# Patient Record
Sex: FEMALE | Race: WHITE
[De-identification: names, ages, dates, MRNs, and addresses within clinical notes are randomized per-mention and may not be internally consistent; named-entity substitution may affect disease eponyms.]

---

## 2017-07-03 ENCOUNTER — HOSPITAL ENCOUNTER (OUTPATIENT)
Dept: HOSPITAL 19 - SDCO | Age: 32
Discharge: HOME | End: 2017-07-03
Attending: SPECIALIST
Payer: SELF-PAY

## 2017-07-03 VITALS — DIASTOLIC BLOOD PRESSURE: 89 MMHG | SYSTOLIC BLOOD PRESSURE: 117 MMHG | HEART RATE: 86 BPM | TEMPERATURE: 98.4 F

## 2017-07-03 VITALS — SYSTOLIC BLOOD PRESSURE: 122 MMHG | HEART RATE: 87 BPM | DIASTOLIC BLOOD PRESSURE: 71 MMHG | TEMPERATURE: 98.3 F

## 2017-07-03 VITALS — DIASTOLIC BLOOD PRESSURE: 63 MMHG | SYSTOLIC BLOOD PRESSURE: 115 MMHG | HEART RATE: 78 BPM

## 2017-07-03 VITALS — HEIGHT: 68 IN | BODY MASS INDEX: 40.4 KG/M2 | WEIGHT: 266.54 LBS

## 2017-07-03 VITALS — DIASTOLIC BLOOD PRESSURE: 67 MMHG | HEART RATE: 78 BPM | SYSTOLIC BLOOD PRESSURE: 114 MMHG

## 2017-07-03 DIAGNOSIS — K20.9: Primary | ICD-10-CM

## 2017-07-03 DIAGNOSIS — K21.0: ICD-10-CM

## 2017-07-03 DIAGNOSIS — R13.12: ICD-10-CM

## 2019-07-19 ENCOUNTER — HOSPITAL ENCOUNTER (OUTPATIENT)
Dept: HOSPITAL 19 - SDCO | Age: 34
Discharge: HOME | End: 2019-07-19
Attending: SPECIALIST
Payer: SELF-PAY

## 2019-07-19 VITALS — HEART RATE: 84 BPM | DIASTOLIC BLOOD PRESSURE: 82 MMHG | SYSTOLIC BLOOD PRESSURE: 142 MMHG

## 2019-07-19 VITALS — DIASTOLIC BLOOD PRESSURE: 88 MMHG | SYSTOLIC BLOOD PRESSURE: 132 MMHG | HEART RATE: 74 BPM

## 2019-07-19 VITALS — DIASTOLIC BLOOD PRESSURE: 74 MMHG | HEART RATE: 82 BPM | TEMPERATURE: 98.3 F | SYSTOLIC BLOOD PRESSURE: 124 MMHG

## 2019-07-19 VITALS — WEIGHT: 275.8 LBS | BODY MASS INDEX: 41.8 KG/M2 | HEIGHT: 68 IN

## 2019-07-19 VITALS — SYSTOLIC BLOOD PRESSURE: 142 MMHG | TEMPERATURE: 98.5 F | DIASTOLIC BLOOD PRESSURE: 92 MMHG | HEART RATE: 82 BPM

## 2019-07-19 DIAGNOSIS — E66.01: ICD-10-CM

## 2019-07-19 DIAGNOSIS — D50.9: ICD-10-CM

## 2019-07-19 DIAGNOSIS — Z88.1: ICD-10-CM

## 2019-07-19 DIAGNOSIS — K22.70: ICD-10-CM

## 2019-07-19 DIAGNOSIS — K21.9: Primary | ICD-10-CM

## 2019-07-19 DIAGNOSIS — R19.7: ICD-10-CM

## 2019-07-19 NOTE — NUR
Dr. Metzger is at the patient's bedside discussing the results of the
procedure with her and her friend at this time. The patient appeared to
tolerate the water well and voices a desire to be discharged homel.

## 2019-07-19 NOTE — NUR
The patient was escorted out via ambulation to a private vehicle by RAJ Powell.
The patient's belongings and discharge paperwork were sent with her. The
patietn's friend, Janna, is present to drive her home.

## 2019-07-19 NOTE — NUR
Discharge instructions were reviewed with the patient and her friend at this
time. They both verbalized understanding and have no questions for the nurse
at this time. The patient's IV to her right hand was removed and a pressure
dressing was applied to the site. The nurse instructed the patient to get
dressed while her friend pulls the car up to the patient entrance.

## 2019-07-19 NOTE — NUR
The patient arrived back to [facility] from [facility] at this time. The
patient appears alert and oriented and denies any pain or nausea at this time.
The patient ambulated from the cart to the recliner in her room with the stand
by assistance of two nurses and appeared to tolerate the activity well. The
patient requests to try some water at this time. The patient's post procedure
vital signs were started at this time. The patient's friend is present at her
bedside. Call light is within reach. Will continue to monitor the patient.

## 2024-09-20 ENCOUNTER — HOSPITAL ENCOUNTER (OUTPATIENT)
Dept: HOSPITAL 19 - SDCO | Age: 39
Discharge: HOME | End: 2024-09-20
Attending: SPECIALIST
Payer: COMMERCIAL

## 2024-09-20 VITALS — BODY MASS INDEX: 44.41 KG/M2 | WEIGHT: 293 LBS | HEIGHT: 68 IN

## 2024-09-20 VITALS — HEART RATE: 70 BPM | SYSTOLIC BLOOD PRESSURE: 135 MMHG | DIASTOLIC BLOOD PRESSURE: 87 MMHG

## 2024-09-20 VITALS — DIASTOLIC BLOOD PRESSURE: 86 MMHG | SYSTOLIC BLOOD PRESSURE: 144 MMHG | HEART RATE: 73 BPM

## 2024-09-20 VITALS — TEMPERATURE: 97.4 F | SYSTOLIC BLOOD PRESSURE: 144 MMHG | HEART RATE: 70 BPM | DIASTOLIC BLOOD PRESSURE: 93 MMHG

## 2024-09-20 DIAGNOSIS — K21.00: Primary | ICD-10-CM

## 2024-09-20 DIAGNOSIS — K22.2: ICD-10-CM

## 2024-09-20 DIAGNOSIS — E66.9: ICD-10-CM

## 2024-09-20 DIAGNOSIS — K22.70: ICD-10-CM

## 2024-09-20 DIAGNOSIS — K44.9: ICD-10-CM

## 2024-09-20 DIAGNOSIS — Z85.828: ICD-10-CM

## 2024-09-20 NOTE — NUR
0850- PT BACK FROM PROCEDURE TO [facility] VIA CART. PT AMBULATED FROM CART TO
CHAIR WITH ASSISTANCE. MONITORS ON AND ALARMS SET. REPORT RECIEVED FROM
[facility]. PT ALERT AND ORIENTED. PT  AT CHAIRSIDE. CALL LIGHT WITHIN
REACH. PT REQUESTING DRINK. NO OTHER NEEDS AT THIS TIME.
 
0900- PT TAKING DRINK WELL. NO COMPLICATIONS NOTED.
 
0920- DISCHARGE INSTRUCTIONS GIVEN TO PT. ALL QUESTIONS ANSWERED.
 
0935- PT TRANSFERRED OUT OF HOSPITAL VIA WHEELCHAIR TAKEN DOWN BY NURSES AID
TO PT'S PRIVATE VEHICLE DRIVEN BY .

## 2024-09-20 NOTE — NUR
The patient ambulated back to [place] [facility] independently using a steady gait and
appeared to tolerate the activity well. Vital signs obtained. Consent signed.
20G IV started in right hand with one stick, LR infusing without difficulty.
Assessment completed. Home medications reconcilled. Warm blanket provided.
, Shawn, brought back to be at her bedside. The patient denies any
further needs at this time.